# Patient Record
Sex: MALE | Race: WHITE | Employment: UNEMPLOYED | URBAN - METROPOLITAN AREA
[De-identification: names, ages, dates, MRNs, and addresses within clinical notes are randomized per-mention and may not be internally consistent; named-entity substitution may affect disease eponyms.]

---

## 2018-07-27 ENCOUNTER — OFFICE VISIT (OUTPATIENT)
Dept: URGENT CARE | Age: 48
End: 2018-07-27

## 2018-07-27 VITALS
OXYGEN SATURATION: 100 % | WEIGHT: 176 LBS | BODY MASS INDEX: 25.2 KG/M2 | SYSTOLIC BLOOD PRESSURE: 122 MMHG | HEIGHT: 70 IN | HEART RATE: 76 BPM | DIASTOLIC BLOOD PRESSURE: 78 MMHG | TEMPERATURE: 98.2 F | RESPIRATION RATE: 14 BRPM

## 2018-07-27 DIAGNOSIS — R42 VERTIGO: Primary | ICD-10-CM

## 2018-07-27 PROCEDURE — G8419 CALC BMI OUT NRM PARAM NOF/U: HCPCS | Performed by: FAMILY MEDICINE

## 2018-07-27 PROCEDURE — G8428 CUR MEDS NOT DOCUMENT: HCPCS | Performed by: FAMILY MEDICINE

## 2018-07-27 PROCEDURE — 1036F TOBACCO NON-USER: CPT | Performed by: FAMILY MEDICINE

## 2018-07-27 PROCEDURE — 99202 OFFICE O/P NEW SF 15 MIN: CPT | Performed by: FAMILY MEDICINE

## 2018-07-27 RX ORDER — METHYLPREDNISOLONE 4 MG/1
TABLET ORAL
Qty: 1 KIT | Refills: 0 | Status: SHIPPED | OUTPATIENT
Start: 2018-07-27 | End: 2018-08-02

## 2018-07-27 RX ORDER — FLUTICASONE PROPIONATE 50 MCG
1 SPRAY, SUSPENSION (ML) NASAL DAILY
COMMUNITY

## 2018-07-27 RX ORDER — MECLIZINE HYDROCHLORIDE 25 MG/1
25 TABLET ORAL 3 TIMES DAILY PRN
Qty: 15 TABLET | Refills: 1 | Status: SHIPPED | OUTPATIENT
Start: 2018-07-27

## 2018-07-27 ASSESSMENT — ENCOUNTER SYMPTOMS
SORE THROAT: 0
SINUS PAIN: 0
COUGH: 0
SINUS PRESSURE: 0

## 2018-07-27 NOTE — PATIENT INSTRUCTIONS
Patient Education        Dizziness: Care Instructions  Your Care Instructions  Dizziness is the feeling of unsteadiness or fuzziness in your head. It is different than having vertigo, which is a feeling that the room is spinning or that you are moving or falling. It is also different from lightheadedness, which is the feeling that you are about to faint. It can be hard to know what causes dizziness. Some people feel dizzy when they have migraine headaches. Sometimes bouts of flu can make you feel dizzy. Some medical conditions, such as heart problems or high blood pressure, can make you feel dizzy. Many medicines can cause dizziness, including medicines for high blood pressure, pain, or anxiety. If a medicine causes your symptoms, your doctor may recommend that you stop or change the medicine. If it is a problem with your heart, you may need medicine to help your heart work better. If there is no clear reason for your symptoms, your doctor may suggest watching and waiting for a while to see if the dizziness goes away on its own. Follow-up care is a key part of your treatment and safety. Be sure to make and go to all appointments, and call your doctor if you are having problems. It's also a good idea to know your test results and keep a list of the medicines you take. How can you care for yourself at home? · If your doctor recommends or prescribes medicine, take it exactly as directed. Call your doctor if you think you are having a problem with your medicine. · Do not drive while you feel dizzy. · Try to prevent falls. Steps you can take include:  ¨ Using nonskid mats, adding grab bars near the tub, and using night-lights. ¨ Clearing your home so that walkways are free of anything you might trip on. ¨ Letting family and friends know that you have been feeling dizzy. This will help them know how to help you. When should you call for help? Call 911 anytime you think you may need emergency care.  For trouble understanding simple statements. ¨ Sudden problems with walking or balance. ¨ A sudden, severe headache that is different from past headaches.    Call your doctor now or seek immediate medical care if:    · Vertigo occurs with a fever, a headache, or ringing in your ears.     · You have new or increased nausea and vomiting.    Watch closely for changes in your health, and be sure to contact your doctor if:    · Vertigo gets worse or happens more often.     · Vertigo has not gotten better after 2 weeks. Where can you learn more? Go to [email or web address] and sign in to your [facility] account. Enter B298 in the Digital Guardian box to learn more about \"Vertigo: Care Instructions. \"     If you do not have an account, please click on the \"Sign Up Now\" link. Current as of: May 12, 2017  Content Version: 11.6  © 6303-7151 [employer]. Care instructions adapted under license by [facility] ([facility]). If you have questions about a medical condition or this instruction, always ask your healthcare professional. [address] any warranty or liability for your use of this information.

## 2018-07-27 NOTE — PROGRESS NOTES
Subjective:      Patient ID: Megan Bowman is a 51 y.o. male. 3 days of vertigo, spinning at times, particularly, with extension of neck. At times, staggers to one side or other. Associated bilateral ear pressure, clogged sensation. Has allergic rhinitis, takes Flonase and Claritin D. No coughing. Some nausea at times. No visual disturbance. No coordination problems. Review of Systems   HENT: Positive for congestion and hearing loss. Negative for ear discharge, ear pain, sinus pain, sinus pressure, sore throat and tinnitus. Respiratory: Negative for cough. Neurological: Positive for dizziness. Negative for speech difficulty, weakness, light-headedness and headaches. All other systems reviewed and are negative. Objective:   Physical Exam   Constitutional: He is oriented to person, place, and time. He appears well-developed and well-nourished. No distress. HENT:   Head: Atraumatic. Right Ear: External ear normal.   Left Ear: External ear normal.   Mouth/Throat: Oropharynx is clear and moist.   Congested pale inferior turbinates bilaterally   Eyes: Conjunctivae and EOM are normal. Right eye exhibits no discharge. Left eye exhibits no discharge. No scleral icterus. Neck: Normal range of motion. Neck supple. No JVD present. No tracheal deviation present. No thyromegaly present. Cardiovascular: Normal rate, regular rhythm and normal heart sounds. Pulmonary/Chest: Effort normal and breath sounds normal.   Musculoskeletal: Normal range of motion. Lymphadenopathy:     He has no cervical adenopathy. Neurological: He is alert and oriented to person, place, and time. No cranial nerve deficit. Coordination normal.   Negative Romberg   Skin: Skin is warm. He is not diaphoretic. Psychiatric: He has a normal mood and affect. His behavior is normal. Thought content normal.   Nursing note and vitals reviewed. No orders of the defined types were placed in this encounter.   No results found for

## 2022-08-02 ENCOUNTER — APPOINTMENT (OUTPATIENT)
Dept: GENERAL RADIOLOGY | Age: 52
End: 2022-08-02
Payer: COMMERCIAL

## 2022-08-02 ENCOUNTER — HOSPITAL ENCOUNTER (EMERGENCY)
Age: 52
Discharge: HOME | End: 2022-08-02
Payer: COMMERCIAL

## 2022-08-02 VITALS
OXYGEN SATURATION: 100 % | BODY MASS INDEX: 25.05 KG/M2 | RESPIRATION RATE: 15 BRPM | DIASTOLIC BLOOD PRESSURE: 83 MMHG | HEART RATE: 98 BPM | WEIGHT: 175 LBS | TEMPERATURE: 98.1 F | HEIGHT: 70 IN | SYSTOLIC BLOOD PRESSURE: 122 MMHG

## 2022-08-02 DIAGNOSIS — E86.0 DEHYDRATION: ICD-10-CM

## 2022-08-02 DIAGNOSIS — R42 DIZZINESS: Primary | ICD-10-CM

## 2022-08-02 DIAGNOSIS — R55 NEAR SYNCOPE: ICD-10-CM

## 2022-08-02 LAB
ALBUMIN SERPL-MCNC: 5 G/DL (ref 3.5–5.1)
ALP BLD-CCNC: 68 U/L (ref 38–126)
ALT SERPL-CCNC: 38 U/L (ref 11–66)
ANION GAP SERPL CALCULATED.3IONS-SCNC: 19 MEQ/L (ref 8–16)
AST SERPL-CCNC: 28 U/L (ref 5–40)
BASOPHILS # BLD: 0.7 %
BASOPHILS ABSOLUTE: 0.1 THOU/MM3 (ref 0–0.1)
BILIRUB SERPL-MCNC: 1 MG/DL (ref 0.3–1.2)
BUN BLDV-MCNC: 16 MG/DL (ref 7–22)
CALCIUM SERPL-MCNC: 10.2 MG/DL (ref 8.5–10.5)
CHLORIDE BLD-SCNC: 100 MEQ/L (ref 98–111)
CO2: 19 MEQ/L (ref 23–33)
CREAT SERPL-MCNC: 1.2 MG/DL (ref 0.4–1.2)
EOSINOPHIL # BLD: 1.1 %
EOSINOPHILS ABSOLUTE: 0.1 THOU/MM3 (ref 0–0.4)
ERYTHROCYTE [DISTWIDTH] IN BLOOD BY AUTOMATED COUNT: 12.7 % (ref 11.5–14.5)
ERYTHROCYTE [DISTWIDTH] IN BLOOD BY AUTOMATED COUNT: 39.1 FL (ref 35–45)
GFR SERPL CREATININE-BSD FRML MDRD: 63 ML/MIN/1.73M2
GLUCOSE BLD-MCNC: 122 MG/DL (ref 70–108)
GLUCOSE BLD-MCNC: 132 MG/DL (ref 70–108)
HCT VFR BLD CALC: 44.2 % (ref 42–52)
HEMOGLOBIN: 15.1 GM/DL (ref 14–18)
IMMATURE GRANS (ABS): 0.04 THOU/MM3 (ref 0–0.07)
IMMATURE GRANULOCYTES: 0.4 %
LYMPHOCYTES # BLD: 36.4 %
LYMPHOCYTES ABSOLUTE: 3.9 THOU/MM3 (ref 1–4.8)
MAGNESIUM: 2.1 MG/DL (ref 1.6–2.4)
MCH RBC QN AUTO: 29.3 PG (ref 26–33)
MCHC RBC AUTO-ENTMCNC: 34.2 GM/DL (ref 32.2–35.5)
MCV RBC AUTO: 85.8 FL (ref 80–94)
MONOCYTES # BLD: 11.1 %
MONOCYTES ABSOLUTE: 1.2 THOU/MM3 (ref 0.4–1.3)
NUCLEATED RED BLOOD CELLS: 0 /100 WBC
OSMOLALITY CALCULATION: 278.7 MOSMOL/KG (ref 275–300)
PLATELET # BLD: 316 THOU/MM3 (ref 130–400)
PMV BLD AUTO: 9.9 FL (ref 9.4–12.4)
POTASSIUM REFLEX MAGNESIUM: 3.5 MEQ/L (ref 3.5–5.2)
RBC # BLD: 5.15 MILL/MM3 (ref 4.7–6.1)
SARS-COV-2, NAAT: NOT  DETECTED
SEG NEUTROPHILS: 50.3 %
SEGMENTED NEUTROPHILS ABSOLUTE COUNT: 5.4 THOU/MM3 (ref 1.8–7.7)
SODIUM BLD-SCNC: 138 MEQ/L (ref 135–145)
TOTAL PROTEIN: 7.8 G/DL (ref 6.1–8)
TROPONIN T: < 0.01 NG/ML
WBC # BLD: 10.8 THOU/MM3 (ref 4.8–10.8)

## 2022-08-02 PROCEDURE — 82948 REAGENT STRIP/BLOOD GLUCOSE: CPT

## 2022-08-02 PROCEDURE — 84484 ASSAY OF TROPONIN QUANT: CPT

## 2022-08-02 PROCEDURE — 71045 X-RAY EXAM CHEST 1 VIEW: CPT

## 2022-08-02 PROCEDURE — 99285 EMERGENCY DEPT VISIT HI MDM: CPT

## 2022-08-02 PROCEDURE — 87635 SARS-COV-2 COVID-19 AMP PRB: CPT

## 2022-08-02 PROCEDURE — 2580000003 HC RX 258: Performed by: PHYSICIAN ASSISTANT

## 2022-08-02 PROCEDURE — 96360 HYDRATION IV INFUSION INIT: CPT

## 2022-08-02 PROCEDURE — 93005 ELECTROCARDIOGRAM TRACING: CPT | Performed by: PHYSICIAN ASSISTANT

## 2022-08-02 PROCEDURE — 96361 HYDRATE IV INFUSION ADD-ON: CPT

## 2022-08-02 PROCEDURE — 83735 ASSAY OF MAGNESIUM: CPT

## 2022-08-02 PROCEDURE — 80053 COMPREHEN METABOLIC PANEL: CPT

## 2022-08-02 PROCEDURE — 85025 COMPLETE CBC W/AUTO DIFF WBC: CPT

## 2022-08-02 RX ORDER — 0.9 % SODIUM CHLORIDE 0.9 %
1000 INTRAVENOUS SOLUTION INTRAVENOUS ONCE
Status: COMPLETED | OUTPATIENT
Start: 2022-08-02 | End: 2022-08-02

## 2022-08-02 RX ADMIN — SODIUM CHLORIDE 1000 ML: 9 INJECTION, SOLUTION INTRAVENOUS at 11:52

## 2022-08-02 RX ADMIN — SODIUM CHLORIDE 1000 ML: 9 INJECTION, SOLUTION INTRAVENOUS at 10:37

## 2022-08-02 ASSESSMENT — ENCOUNTER SYMPTOMS
DIARRHEA: 0
STRIDOR: 0
PHOTOPHOBIA: 0
EYES NEGATIVE: 1
WHEEZING: 0
RHINORRHEA: 0
ABDOMINAL PAIN: 0
CHEST TIGHTNESS: 0
SORE THROAT: 0
VOMITING: 0
COUGH: 0

## 2022-08-02 ASSESSMENT — PAIN - FUNCTIONAL ASSESSMENT: PAIN_FUNCTIONAL_ASSESSMENT: NONE - DENIES PAIN

## 2022-08-02 NOTE — Clinical Note
Alicja Thomas was seen and treated in our emergency department on 8/2/2022. He may return to work on 08/03/2022. Patient seen and evaluated in the ED today. May return to work tomorrow full time/full duty if symptoms improved. If you have any questions or concerns, please don't hesitate to call.       Karen Bernard PA-C

## 2022-08-02 NOTE — DISCHARGE INSTRUCTIONS
Continue plenty of clear fluid oral hydration, good nutrition and rest. Follow up with PCP if not improving. If worsening symptoms of lightheaded or dizziness may follow up to ED for re-evaluation.

## 2022-08-02 NOTE — ED PROVIDER NOTES
[facility] [address] [address] EMERGENCY DEPT    EMERGENCY MEDICINE     Pt Name: Jannet Walker  MRN: [id number]  [place] 1970  Date of evaluation: 8/2/2022  Provider: Susanne Portillo PA-C    CHIEF COMPLAINT       Chief Complaint   Patient presents with    Dizziness       HISTORY OF PRESENT ILLNESS    Jannet Walker is a pleasant 47 y.o. male who presents to the emergency department for dizziness, tingling arms, pre-syncopal feeling. 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 He also felt somewhat nauseous and endorses SOB and is unsure if he also had numbness or tingling in his lower extremities. This subsided as he relaxed in a bed in the ED and he reports that he currently is still warm, but no longer feels nauseated, pre-syncopal, or any extremity tingling. This morning he drank coffee, did not consume breakfast, and was not working in an overly-heated area. He denies any chest pain, abdominal pain, weakness, confusion, HA, changes in vision, hearing, or speech, emesis, difficulty or changes in urinary or bowel movements, no URI symptoms, and no recent illness or sick contacts. His history is pertinent for his father and uncle having ACS's in their 52's. Triage notes and Nursing notes were reviewed by myself. Any discrepancies are addressed above.     PAST MEDICAL HISTORY     Past Medical History:   Diagnosis Date    Allergic rhinitis        SURGICAL HISTORY       Past Surgical History:   Procedure Laterality Date    SINUS SURGERY  2015/2016    TONSILLECTOMY  1977       CURRENT MEDICATIONS       Discharge Medication List as of 8/2/2022  1:20 PM        CONTINUE these medications which have NOT CHANGED    Details   Loratadine-Pseudoephedrine (CLARITIN-D 24 HOUR PO) Take by mouthHistorical Med      fluticasone (FLONASE) 50 MCG/ACT nasal spray 1 spray by Nasal route dailyHistorical Med      meclizine (ANTIVERT) 25 MG tablet Take 1 tablet by mouth 3 times daily as needed for Dizziness or Nausea, Disp-15 tablet, R-1Normal             ALLERGIES     Pcn [penicillins]    FAMILY HISTORY       Family History   Problem Relation Age of Onset    Diabetes Mother     Cirrhosis Mother     Heart Disease Father     Heart Attack Father         SOCIAL HISTORY       Social History     Socioeconomic History    Marital status:    Tobacco Use    Smoking status: Never    Smokeless tobacco: Never   Substance and Sexual Activity    Alcohol use: Not Currently     Comment: once a year    Drug use: No       REVIEW OF SYSTEMS     Review of Systems   Constitutional:  Positive for diaphoresis. Negative for appetite change, chills, fatigue and fever. HENT:  Negative for congestion, ear pain, hearing loss, rhinorrhea, sore throat and tinnitus. Eyes: Negative. Negative for photophobia and visual disturbance. Respiratory:  Negative for cough, chest tightness, shortness of breath, wheezing and stridor. Cardiovascular:  Negative for chest pain and palpitations. Gastrointestinal:  Negative for abdominal pain, diarrhea, nausea and vomiting. Endocrine: Negative. Genitourinary:  Negative for difficulty urinating, dysuria, frequency, hematuria and urgency. Musculoskeletal:  Negative for myalgias, neck pain and neck stiffness. Skin:  Negative for rash. Neurological:  Positive for dizziness and light-headedness. Negative for tremors, seizures, syncope, facial asymmetry, speech difficulty, weakness, numbness and headaches. Hematological: Negative. Does not bruise/bleed easily. Psychiatric/Behavioral: Negative. Negative for confusion and decreased concentration. The patient is not nervous/anxious. All other systems reviewed and are negative. Except as noted above the remainder of the review of systems was reviewed and is.      SCREENINGS        Corona Coma Scale  Eye Opening: Spontaneous  Best Verbal Response: Oriented  Best Motor Response: Obeys commands  Edward Coma Scale Score: 15                 PHYSICAL EXAM     INITIAL VITALS:  height is 5' 10\" (1.778 m) and weight is 175 lb (79.4 kg). His oral temperature is 98.1 °F (36.7 °C). His blood pressure is 122/83 and his pulse is 98. His respiration is 15 and oxygen saturation is 100%. Physical Exam  Vitals and nursing note reviewed. Exam conducted with a chaperone present. Constitutional:       General: He is not in acute distress. Appearance: Normal appearance. He is normal weight. He is not ill-appearing, toxic-appearing or diaphoretic. HENT:      Head: Normocephalic and atraumatic. Right Ear: External ear normal.      Left Ear: External ear normal.      Nose: Nose normal.      Mouth/Throat:      Mouth: Mucous membranes are moist.      Pharynx: Oropharynx is clear. Eyes:      Extraocular Movements: Extraocular movements intact. Conjunctiva/sclera: Conjunctivae normal.      Pupils: Pupils are equal, round, and reactive to light. Neck:      Vascular: No carotid bruit. Cardiovascular:      Rate and Rhythm: Regular rhythm. Tachycardia present. Pulses: Normal pulses. Heart sounds: Normal heart sounds. No murmur heard. Pulmonary:      Effort: Pulmonary effort is normal. No respiratory distress. Breath sounds: Normal breath sounds. No stridor. No wheezing or rales. Chest:      Chest wall: No tenderness. Abdominal:      General: Bowel sounds are normal. There is no distension. Palpations: Abdomen is soft. Tenderness: There is no abdominal tenderness. Musculoskeletal:         General: Normal range of motion. Cervical back: Normal range of motion and neck supple. No rigidity. Skin:     General: Skin is warm and dry. Capillary Refill: Capillary refill takes less than 2 seconds. Coloration: Skin is not jaundiced or pale.       Findings: No erythema or rash.   Neurological:      General: No focal deficit present. Mental Status: He is alert and oriented to person, place, and time. Mental status is at baseline. GCS: GCS eye subscore is 4. GCS verbal subscore is 5. GCS motor subscore is 6. Cranial Nerves: Cranial nerves are intact. Sensory: Sensation is intact. No sensory deficit. Motor: No weakness. Psychiatric:         Mood and Affect: Mood normal.         Behavior: Behavior normal.         Thought Content: Thought content normal.       DIFFERENTIAL DIAGNOSIS:   Differential diagnoses are discussed     DIAGNOSTIC RESULTS     EKG:(none if blank)  All EKGs are interpreted by the Emergency Department Physician who either signs or Co-signs this chart in the absence of a cardiologist.    Component Ref Range & Units 8/2/22 1023    Ventricular Rate     Atrial Rate     P-R Interval ms 150    QRS Duration ms 96    Q-T Interval ms 316    QTc Calculation (Bazett) ms 453    P Axis degrees 77    R Axis degrees 78    T Axis degrees 51    Resulting Agency  [facility]             Narrative & Impression    Sinus tachycardia  Possible Left atrial enlargement  Borderline ECG  No previous ECGs available  Confirmed by Azalia Colon ([id number]) on 8/3/2022 6:52:50 PM               RADIOLOGY: (none if blank)   I directly visualized the following images and reviewed the radiologist interpretations. Interpretation per the Radiologist below, if available at the time of this note:  XR CHEST PORTABLE   Final Result   Acute or chronic bronchitis            **This report has been created using voice recognition software. It may contain minor errors which are inherent in voice recognition technology. **      Final report electronically signed by Dr. Haywood Seen on 8/2/2022 11:15 AM          LABS:   Labs Reviewed   COMPREHENSIVE METABOLIC PANEL W/ REFLEX TO MG FOR LOW K - Abnormal; Notable for the following components:       Result Value    Glucose 132 (*) CO2 19 (*)     All other components within normal limits   ANION GAP - Abnormal; Notable for the following components:    Anion Gap 19.0 (*)     All other components within normal limits   GLOMERULAR FILTRATION RATE, ESTIMATED - Abnormal; Notable for the following components:    Est, Glom Filt Rate 63 (*)     All other components within normal limits   POCT GLUCOSE - Abnormal; Notable for the following components:    POC Glucose 122 (*)     All other components within normal limits   COVID-19, RAPID   CBC WITH AUTO DIFFERENTIAL   TROPONIN   MAGNESIUM   OSMOLALITY   URINALYSIS WITH REFLEX TO CULTURE       All other labs were within normal range or not returned as of this dictation. Please note, any cultures that may have been sent were not resulted at the time of this patient visit. EMERGENCY DEPARTMENT COURSE:   Vitals:    Vitals:    08/02/22 1033 08/02/22 1218   BP: 118/64 122/83   Pulse: (!) 122 98   Resp: 18 15   Temp: 98.1 °F (36.7 °C)    TempSrc: Oral    SpO2: 100% 100%   Weight: 175 lb (79.4 kg)    Height: 5' 10\" (1.778 m)      11:16 AM EDT: The patient was seen and evaluated. PROCEDURES: (None if blank)  Procedures         ED Medications administered this visit:    Medications   0.9 % sodium chloride bolus (0 mLs IntraVENous Stopped 8/2/22 1138)   0.9 % sodium chloride bolus (0 mLs IntraVENous Stopped 8/2/22 1330)       MDM:  Patient is a 47y/o male who came to the ED to be evaluated for dizziness, pre-syncopal feeling, upper extremity tingling. Appropriate testing/imaging of EKG, CXR,  cardiac workup was done based on the patient's initial complaints, history, and physical exam.   Pertinent results were CXR revealing acute or chronic bronchitis. Patient has no current complaints of chest pain or shortness of breath or coughing. No complaints of congestion. Patient was given IV fluids which drastically helped patient's symptoms. He had noted anion gap 19 and CO2 19.   Discussed with patient likely secondary to dehydration. He has no current complaints of numbness and tingling or any weakness on exam.  With patient's improvements from the IV fluids and vitals improved with no current tachycardia he may be discharged home from the ED. Follow-up with PCP in the next 2 to 3 days for reevaluation. If worsening symptoms may follow-up to the ED soon as possible. Patient was seen independently by myself. The patient's final impression and disposition and plan was determined by myself. Strict return precautions and follow up instructions were discussed with the patient prior to discharge, with which the patient agrees. Physical assessment findings, diagnostic testing(s) if applicable, and vital signs reviewed with patient/patient representative. Questions answered. Medications as directed, including OTC medications for supportive care. Education provided on medications. Differential diagnosis(s) discussed with patient/patient representative. Home care/self care instructions reviewed with patient/patient representative. Patient is to follow-up with family care provider in 2-3 days if no improvement. Patient is to go to the emergency department if symptoms worsen. Patient/patient representative is aware of care plan, questions answered, verbalizes understanding and is in agreement. CRITICAL CARE:   None    CONSULTS:  None    PROCEDURES:  None    FINAL IMPRESSION      1. Dizziness    2. Near syncope    3.  Dehydration          DISPOSITION/PLAN   Discharge home    PATIENT REFERRED TO:  PCP    In 3 days  If not improving    [facility] EMERGENCY DEPT  [address]  [phone]  In 2 days  If symptoms worsen    DISCHARGEMEDICATIONS:  Discharge Medication List as of 8/2/2022  1:20 PM               (Please note that portions of this note were completed with a voice recognition program.  Efforts were made to edit the dictations but occasionallywords are

## 2022-08-02 NOTE — ED NOTES
ED nurse-to-nurse bedside report    Chief Complaint   Patient presents with    Dizziness      LOC: alert and orientated to name, place, date  Vital signs   Vitals:    08/02/22 1033   BP: 118/64   Pulse: (!) 122   Resp: 18   Temp: 98.1 °F (36.7 °C)   TempSrc: Oral   SpO2: 100%   Weight: 175 lb (79.4 kg)   Height: 5' 10\" (1.778 m)      Pain:    Pain Interventions: none  Pain Goal: 0  Oxygen: No    Current needs required: Needs urine sample   Telemetry: Yes  LDAs:   Peripheral IV 08/02/22 Right Antecubital (Active)   Site Assessment Clean, dry & intact 08/02/22 1034   Line Status Blood return noted;Brisk blood return;Normal saline locked; Flushed 08/02/22 1034     Continuous Infusions:   Mobility: Requires assistance * 1  Corbin Fall Risk Score: No flowsheet data found.   Fall Interventions: none  Report given to: Glenda Murphy RN  08/02/22 1491

## 2022-08-03 LAB
EKG ATRIAL RATE: 124 BPM
EKG P AXIS: 77 DEGREES
EKG P-R INTERVAL: 150 MS
EKG Q-T INTERVAL: 316 MS
EKG QRS DURATION: 96 MS
EKG QTC CALCULATION (BAZETT): 453 MS
EKG R AXIS: 78 DEGREES
EKG T AXIS: 51 DEGREES
EKG VENTRICULAR RATE: 124 BPM

## 2022-08-03 PROCEDURE — 93010 ELECTROCARDIOGRAM REPORT: CPT | Performed by: INTERNAL MEDICINE

## 2022-08-03 ASSESSMENT — ENCOUNTER SYMPTOMS
NAUSEA: 0
SHORTNESS OF BREATH: 0

## 2022-09-29 NOTE — ED NOTES
Patient in bed and talking with family on the phone. Patient provided education regarding current situation with pt plan of care. Patient expresses no needs. Patient VSS and patient does not appear to be in any current distress at this time. Will continue to monitor. Call light within reach.        Judy Jaquez RN  08/02/22 5702 [Palliative] : Goals of care discussed with patient: Palliative [Palliative Care Plan] : not applicable at this time